# Patient Record
(demographics unavailable — no encounter records)

---

## 2024-12-17 NOTE — REVIEW OF SYSTEMS
[Dyspnea on Exertion] : dyspnea on exertion [Negative] : Heme/Lymph [Shortness Of Breath] : no shortness of breath [Wheezing] : no wheezing [Cough] : no cough [FreeTextEntry9] : as per HPI

## 2024-12-17 NOTE — HISTORY OF PRESENT ILLNESS
[FreeTextEntry1] : CAD, PCI, chronic atrial fibrillation, hypertension, dyslipidemia gout, el ferritin, el WBC neck pain [de-identified] : 78 years of age with CAD, PCI, chronic atrial fibrillation, hypertension, dyslipidemia, anemia, here for f/u Has been following with cardiology. Notes reviewed.  Statin dose increased.  Diuretic changed to aldactone.      Changed to Eliquis.   s/p rt knee pains-  saw ortho - s/p injection with relief. Neck has been improved since last time.  Has now only intermittent sx.  Reports that the muscle relaxant was denied by his insurance.   holding the vit D since the high level.   no change in the le edema.  no orthopnea.  no other CV sx.   No GI sx.  no unusual bleeding or bleeding.   Otherwise, he has been doing well.  Reprots having checked the BP at home.  mainly reading as low to nl.   no other c/o. former smoker. walks for exercise.  not as much.   diet okay. Has been taking the gout rx.  tolerating w/o issues.  No further sx -  Has been having mild pain at the left thumb - has been going away.    FIT - 2/23 - has the kit PSA - has been getting checked. 1/24  gets flu vaccine Pneumo - in the past - ? w/i 5 years. Had covid vaccine + booster

## 2024-12-17 NOTE — ASSESSMENT
[FreeTextEntry1] : to check re: last pneumo and coverage.  will poss give at f/u.  The vaccine was discussed with the patient.

## 2024-12-17 NOTE — HEALTH RISK ASSESSMENT
[Yes] : Yes [Monthly or less (1 pt)] : Monthly or less (1 point) [1 or 2 (0 pts)] : 1 or 2 (0 points) [Never (0 pts)] : Never (0 points) [No] : In the past 12 months have you used drugs other than those required for medical reasons? No [0] : 2) Feeling down, depressed, or hopeless: Not at all (0) [PHQ-2 Negative - No further assessment needed] : PHQ-2 Negative - No further assessment needed [Former] : Former [Audit-CScore] : 1 [[id number]] : 0

## 2025-02-12 NOTE — REVIEW OF SYSTEMS
[Lower Ext Edema] : lower extremity edema [Negative] : Heme/Lymph [FreeTextEntry5] : LE Edema significantly improved

## 2025-02-12 NOTE — PHYSICAL EXAM
[Well Developed] : well developed [Well Nourished] : well nourished [No Acute Distress] : no acute distress [Normal S1, S2] : normal S1, S2 [No Murmur] : no murmur [No Rub] : no rub [No Gallop] : no gallop [1+] : left 1+ [Clear Lung Fields] : clear lung fields [Good Air Entry] : good air entry [No Respiratory Distress] : no respiratory distress  [Normal Gait] : normal gait [No Cyanosis] : no cyanosis [No Clubbing] : no clubbing [No Varicosities] : no varicosities [Edema ___] : edema [unfilled] [Moves all extremities] : moves all extremities [No Focal Deficits] : no focal deficits [Normal Speech] : normal speech [Alert and Oriented] : alert and oriented [Normal memory] : normal memory [Right Carotid Bruit] : no bruit heard over the right carotid [Left Carotid Bruit] : no bruit heard over the left carotid

## 2025-02-12 NOTE — DISCUSSION/SUMMARY
[___ Month(s)] : in [unfilled] month(s) [EKG obtained to assist in diagnosis and management of assessed problem(s)] : EKG obtained to assist in diagnosis and management of assessed problem(s) [FreeTextEntry1] : LDL near  goal of 70.  Stay on Lipitor 80 mg daily. Continue metoprolol, fosinopril, Aldactone for blood pressure control. Hypokalemia resolved.  No peripheral edema ,normotensive today. Palpitations resolved with conversion to sinus rhythm. Heart healthy diet and lifestyle encouraged.

## 2025-02-12 NOTE — HISTORY OF PRESENT ILLNESS
[FreeTextEntry1] : 77 yo male with h/o paroxysmal atrial fibrillation, CAD, dyslipidemia, hypertension and overweight by BMI.   Denies any chest pain, palpitations or dyspnea on exertion. Seen after last visit for hypokalemia/hypotension and d/c of chlorthalidone. Given Aldactone instead. Asymptomatic today and has no interval symptoms. In fact, states that his palpitations have resolved for the most part.  Labs from PCP in 12/24 reviewed with patient.

## 2025-02-12 NOTE — CARDIOLOGY SUMMARY
[de-identified] : 2/12/24, SR + APC, lo0w voltage, poor R wave progression 6/12/24, Atrial fibrillation, Low voltage in precordial leads.-RSR(V1) -nondiagnostic. 9/8/23 Atrial fibrillation with controlled ventricular response. [de-identified] : 12/4/23 - Conclusions: 1. The patient underwent stress testing using pharmacological IV regadenoson (bolus injection, 400mcg over 10 to 20 seconds followed by 5ml flush) protocol. _ The total procedure time was 4 minutes . _ The peak heart rate was 108 bpm: 76 % of the patient's predicted maximal heart rate. The heart rate response was normal pharmocologic heart rate response. 2. Qualitative Perfusion: - small-sized, mild defect(s) in the apical wall that is fixed suggestive of an infarct. 3. The resting left ventricular EF% is 71 %. The resting end diastolic volume is 83 ml and systolic volume is 24 ml.  [de-identified] : 9/15/23, EF60-65%, trace MR,TR,WI. [de-identified] : 2006 - s/p PCI x 3

## 2025-02-12 NOTE — REASON FOR VISIT
[Cardiac Failure] : cardiac failure [CV Risk Factors and Non-Cardiac Disease] : CV risk factors and non-cardiac disease [Hypertension] : hypertension

## 2025-03-25 NOTE — HISTORY OF PRESENT ILLNESS
[FreeTextEntry1] : CAD, PCI, chronic atrial fibrillation, hypertension, dyslipidemia gout, el ferritin, el WBC neck pain. [de-identified] : 78 years of age with CAD, PCI, chronic atrial fibrillation, hypertension, dyslipidemia, anemia, here for f/u Has been following with cardiology. Note reviewed.       Had the flu in Jan. Changed to Eliquis.   s/p rt knee pains - saw ortho - s/p injection with relief. Neck has been improved since last time.  Has now only intermittent sx.  Reports that the muscle relaxant was denied by his insurance.   holding the vit D since the high level.   no change in the le edema.  no orthopnea.  no other CV sx.   No GI sx.  no unusual bleeding or bleeding.   Otherwise, he has been doing well.  Reports having checked the BP at home.  mainly reading as low to nl.   no other c/o. former smoker. walks for exercise.  not as much.   diet okay. Has been taking the gout rx.  tolerating w/o issues.  No further sx -  Has been having mild pain at the left thumb - has been better.    FIT - 2/23 - has the kit PSA - has been getting checked. 1/24  gets flu vaccine Pneumo - in the past - ? w/i 5 years. Had covid vaccine + booster

## 2025-03-25 NOTE — HEALTH RISK ASSESSMENT
[Yes] : Yes [Monthly or less (1 pt)] : Monthly or less (1 point) [1 or 2 (0 pts)] : 1 or 2 (0 points) [Never (0 pts)] : Never (0 points) [No] : In the past 12 months have you used drugs other than those required for medical reasons? No [0] : 2) Feeling down, depressed, or hopeless: Not at all (0) [PHQ-2 Negative - No further assessment needed] : PHQ-2 Negative - No further assessment needed [Former] : Former [Audit-CScore] : 1 [[id number]] : 0

## 2025-03-25 NOTE — PHYSICAL EXAM
[No Acute Distress] : no acute distress [Well Nourished] : well nourished [Well Developed] : well developed [Well-Appearing] : well-appearing [Normal Voice/Communication] : normal voice/communication [Normal Sclera/Conjunctiva] : normal sclera/conjunctiva [PERRL] : pupils equal round and reactive to light [EOMI] : extraocular movements intact [Normal Outer Ear/Nose] : the outer ears and nose were normal in appearance [Normal Oropharynx] : the oropharynx was normal [No JVD] : no jugular venous distention [No Lymphadenopathy] : no lymphadenopathy [Supple] : supple [Thyroid Normal, No Nodules] : the thyroid was normal and there were no nodules present [No Respiratory Distress] : no respiratory distress  [No Accessory Muscle Use] : no accessory muscle use [Clear to Auscultation] : lungs were clear to auscultation bilaterally [Normal Rate] : normal rate  [Regular Rhythm] : with a regular rhythm [Normal S1, S2] : normal S1 and S2 [No Murmur] : no murmur heard [No Carotid Bruits] : no carotid bruits [No Abdominal Bruit] : a ~M bruit was not heard ~T in the abdomen [Pedal Pulses Present] : the pedal pulses are present [No Palpable Aorta] : no palpable aorta [Soft] : abdomen soft [Non Tender] : non-tender [Non-distended] : non-distended [No Masses] : no abdominal mass palpated [No HSM] : no HSM [Normal Bowel Sounds] : normal bowel sounds [Normal Posterior Cervical Nodes] : no posterior cervical lymphadenopathy [Normal Anterior Cervical Nodes] : no anterior cervical lymphadenopathy [No CVA Tenderness] : no CVA  tenderness [No Spinal Tenderness] : no spinal tenderness [No Joint Swelling] : no joint swelling [Grossly Normal Strength/Tone] : grossly normal strength/tone [No Rash] : no rash [Coordination Grossly Intact] : coordination grossly intact [No Focal Deficits] : no focal deficits [Normal Gait] : normal gait [Speech Grossly Normal] : speech grossly normal [Memory Grossly Normal] : memory grossly normal [Normal Affect] : the affect was normal [Alert and Oriented x3] : oriented to person, place, and time [Normal Mood] : the mood was normal [Normal Insight/Judgement] : insight and judgment were intact [de-identified] : min trace edema

## 2025-03-25 NOTE — HEALTH RISK ASSESSMENT
[Yes] : Yes [Monthly or less (1 pt)] : Monthly or less (1 point) [1 or 2 (0 pts)] : 1 or 2 (0 points) [Never (0 pts)] : Never (0 points) [No] : In the past 12 months have you used drugs other than those required for medical reasons? No [0] : 2) Feeling down, depressed, or hopeless: Not at all (0) [PHQ-2 Negative - No further assessment needed] : PHQ-2 Negative - No further assessment needed [Former] : Former [Audit-CScore] : 1 [[id number]] : 0

## 2025-03-25 NOTE — HISTORY OF PRESENT ILLNESS
[FreeTextEntry1] : CAD, PCI, chronic atrial fibrillation, hypertension, dyslipidemia gout, el ferritin, el WBC neck pain. [de-identified] : 78 years of age with CAD, PCI, chronic atrial fibrillation, hypertension, dyslipidemia, anemia, here for f/u Has been following with cardiology. Note reviewed.       Had the flu in Jan. Changed to Eliquis.   s/p rt knee pains - saw ortho - s/p injection with relief. Neck has been improved since last time.  Has now only intermittent sx.  Reports that the muscle relaxant was denied by his insurance.   holding the vit D since the high level.   no change in the le edema.  no orthopnea.  no other CV sx.   No GI sx.  no unusual bleeding or bleeding.   Otherwise, he has been doing well.  Reports having checked the BP at home.  mainly reading as low to nl.   no other c/o. former smoker. walks for exercise.  not as much.   diet okay. Has been taking the gout rx.  tolerating w/o issues.  No further sx -  Has been having mild pain at the left thumb - has been better.    FIT - 2/23 - has the kit PSA - has been getting checked. 1/24  gets flu vaccine Pneumo - in the past - ? w/i 5 years. Had covid vaccine + booster

## 2025-03-25 NOTE — PHYSICAL EXAM
[No Acute Distress] : no acute distress [Well Nourished] : well nourished [Well Developed] : well developed [Well-Appearing] : well-appearing [Normal Voice/Communication] : normal voice/communication [Normal Sclera/Conjunctiva] : normal sclera/conjunctiva [PERRL] : pupils equal round and reactive to light [EOMI] : extraocular movements intact [Normal Outer Ear/Nose] : the outer ears and nose were normal in appearance [Normal Oropharynx] : the oropharynx was normal [No JVD] : no jugular venous distention [No Lymphadenopathy] : no lymphadenopathy [Supple] : supple [Thyroid Normal, No Nodules] : the thyroid was normal and there were no nodules present [No Respiratory Distress] : no respiratory distress  [No Accessory Muscle Use] : no accessory muscle use [Clear to Auscultation] : lungs were clear to auscultation bilaterally [Normal Rate] : normal rate  [Regular Rhythm] : with a regular rhythm [Normal S1, S2] : normal S1 and S2 [No Murmur] : no murmur heard [No Carotid Bruits] : no carotid bruits [No Abdominal Bruit] : a ~M bruit was not heard ~T in the abdomen [Pedal Pulses Present] : the pedal pulses are present [No Palpable Aorta] : no palpable aorta [Soft] : abdomen soft [Non Tender] : non-tender [Non-distended] : non-distended [No Masses] : no abdominal mass palpated [No HSM] : no HSM [Normal Bowel Sounds] : normal bowel sounds [Normal Posterior Cervical Nodes] : no posterior cervical lymphadenopathy [Normal Anterior Cervical Nodes] : no anterior cervical lymphadenopathy [No CVA Tenderness] : no CVA  tenderness [No Spinal Tenderness] : no spinal tenderness [No Joint Swelling] : no joint swelling [Grossly Normal Strength/Tone] : grossly normal strength/tone [No Rash] : no rash [Coordination Grossly Intact] : coordination grossly intact [No Focal Deficits] : no focal deficits [Normal Gait] : normal gait [Speech Grossly Normal] : speech grossly normal [Memory Grossly Normal] : memory grossly normal [Normal Affect] : the affect was normal [Alert and Oriented x3] : oriented to person, place, and time [Normal Mood] : the mood was normal [Normal Insight/Judgement] : insight and judgment were intact [de-identified] : min trace edema

## 2025-07-01 NOTE — PHYSICAL EXAM
[No Acute Distress] : no acute distress [Well Nourished] : well nourished [Well Developed] : well developed [Well-Appearing] : well-appearing [Normal Voice/Communication] : normal voice/communication [Normal Sclera/Conjunctiva] : normal sclera/conjunctiva [PERRL] : pupils equal round and reactive to light [EOMI] : extraocular movements intact [Normal Outer Ear/Nose] : the outer ears and nose were normal in appearance [Normal Oropharynx] : the oropharynx was normal [No JVD] : no jugular venous distention [No Lymphadenopathy] : no lymphadenopathy [Supple] : supple [Thyroid Normal, No Nodules] : the thyroid was normal and there were no nodules present [No Respiratory Distress] : no respiratory distress  [No Accessory Muscle Use] : no accessory muscle use [Clear to Auscultation] : lungs were clear to auscultation bilaterally [Normal Rate] : normal rate  [Regular Rhythm] : with a regular rhythm [Normal S1, S2] : normal S1 and S2 [No Murmur] : no murmur heard [No Carotid Bruits] : no carotid bruits [No Abdominal Bruit] : a ~M bruit was not heard ~T in the abdomen [Pedal Pulses Present] : the pedal pulses are present [No Palpable Aorta] : no palpable aorta [Soft] : abdomen soft [Non Tender] : non-tender [Non-distended] : non-distended [No Masses] : no abdominal mass palpated [No HSM] : no HSM [Normal Bowel Sounds] : normal bowel sounds [Normal Posterior Cervical Nodes] : no posterior cervical lymphadenopathy [Normal Anterior Cervical Nodes] : no anterior cervical lymphadenopathy [No CVA Tenderness] : no CVA  tenderness [No Spinal Tenderness] : no spinal tenderness [No Joint Swelling] : no joint swelling [Grossly Normal Strength/Tone] : grossly normal strength/tone [No Rash] : no rash [Coordination Grossly Intact] : coordination grossly intact [No Focal Deficits] : no focal deficits [Normal Gait] : normal gait [Speech Grossly Normal] : speech grossly normal [Memory Grossly Normal] : memory grossly normal [Normal Affect] : the affect was normal [Alert and Oriented x3] : oriented to person, place, and time [Normal Mood] : the mood was normal [Normal Insight/Judgement] : insight and judgment were intact [de-identified] : min trace edema

## 2025-07-01 NOTE — HEALTH RISK ASSESSMENT
[Yes] : Yes [Monthly or less (1 pt)] : Monthly or less (1 point) [1 or 2 (0 pts)] : 1 or 2 (0 points) [Never (0 pts)] : Never (0 points) [0] : 2) Feeling down, depressed, or hopeless: Not at all (0) [PHQ-2 Negative - No further assessment needed] : PHQ-2 Negative - No further assessment needed [Former] : Former [Audit-CScore] : 1 [[id number]] : 0

## 2025-07-01 NOTE — HISTORY OF PRESENT ILLNESS
[FreeTextEntry1] : CAD, PCI, chronic atrial fibrillation, hypertension, dyslipidemia gout, el ferritin, el WBC neck pain. [de-identified] : 78 years of age with CAD, PCI, chronic atrial fibrillation, hypertension, dyslipidemia, anemia, here for f/u Has been following with cardiology.      Had the flu in Jan. Changed to Eliquis.   s/p rt knee pains - saw ortho - s/p injection with relief. Neck has been improved since last time.  Has now only intermittent sx.  Reports that the muscle relaxant was denied by his insurance.   holding the vit D since the high level.   no change in the le edema.  no orthopnea.  some RUVALCABA with walking - no change - described as more of a fatigue.  no other CV sx.   No GI sx.  no unusual bleeding or bleeding.   Otherwise, he has been doing well.  Reports having checked the BP at home.  mainly reading as low to nl.  Has been taking 1/2 of the spironolactone.  no noted change.  no wt change. no other c/o. former smoker. walks for exercise.  not as much.   diet okay. Has been taking the gout rx.  tolerating w/o issues.  No further sx - Has been taking the colchicine and not the allopurinol.   Has been having mild pain at the left thumb - has been better.    FIT - 4/25 PSA - has been getting checked. 1/24  gets flu vaccine Pneumo - in the past - ? w/i 5 years. Had covid vaccine + booster

## 2025-07-01 NOTE — COUNSELING
[Potential consequences of obesity discussed] : Potential consequences of obesity discussed [Benefits of weight loss discussed] : Benefits of weight loss discussed pending disposition [Encouraged to increase physical activity] : Encouraged to increase physical activity [Decrease Portions] : decrease portions [None] : None [Good understanding] : Patient has a good understanding of lifestyle changes and steps needed to achieve self management goal